# Patient Record
Sex: FEMALE | Race: BLACK OR AFRICAN AMERICAN | Employment: FULL TIME | URBAN - METROPOLITAN AREA
[De-identification: names, ages, dates, MRNs, and addresses within clinical notes are randomized per-mention and may not be internally consistent; named-entity substitution may affect disease eponyms.]

---

## 2017-02-24 ENCOUNTER — TELEPHONE (OUTPATIENT)
Dept: OBGYN CLINIC | Age: 32
End: 2017-02-24

## 2018-06-14 ENCOUNTER — HOSPITAL ENCOUNTER (OUTPATIENT)
Dept: LAB | Age: 33
Discharge: HOME | End: 2018-06-14
Payer: MEDICAID

## 2018-06-14 ENCOUNTER — OFFICE VISIT (OUTPATIENT)
Dept: OBGYN CLINIC | Age: 33
End: 2018-06-14

## 2018-06-14 VITALS
OXYGEN SATURATION: 100 % | BODY MASS INDEX: 30.43 KG/M2 | WEIGHT: 155 LBS | SYSTOLIC BLOOD PRESSURE: 130 MMHG | RESPIRATION RATE: 18 BRPM | DIASTOLIC BLOOD PRESSURE: 78 MMHG | HEART RATE: 77 BPM | HEIGHT: 60 IN

## 2018-06-14 DIAGNOSIS — R35.0 URINARY FREQUENCY: ICD-10-CM

## 2018-06-14 DIAGNOSIS — Z30.011 ENCOUNTER FOR INITIAL PRESCRIPTION OF CONTRACEPTIVE PILLS: ICD-10-CM

## 2018-06-14 DIAGNOSIS — Z01.419 ENCOUNTER FOR GYNECOLOGICAL EXAMINATION WITHOUT ABNORMAL FINDING: Primary | ICD-10-CM

## 2018-06-14 LAB
BILIRUB UR QL STRIP: NEGATIVE
GLUCOSE UR-MCNC: NEGATIVE MG/DL
HCG URINE, QL. (POC): NEGATIVE
KETONES P FAST UR STRIP-MCNC: NEGATIVE MG/DL
PH UR STRIP: 8.5 [PH] (ref 4.6–8)
PROT UR QL STRIP: NEGATIVE
SP GR UR STRIP: 1.02 (ref 1–1.03)
UA UROBILINOGEN AMB POC: ABNORMAL (ref 0.2–1)
URINALYSIS CLARITY POC: CLEAR
URINALYSIS COLOR POC: YELLOW
URINE BLOOD POC: ABNORMAL
URINE LEUKOCYTES POC: NEGATIVE
URINE NITRITES POC: NEGATIVE
VALID INTERNAL CONTROL?: YES

## 2018-06-14 PROCEDURE — 87624 HPV HI-RISK TYP POOLED RSLT: CPT | Performed by: OBSTETRICS & GYNECOLOGY

## 2018-06-14 PROCEDURE — 87491 CHLMYD TRACH DNA AMP PROBE: CPT | Performed by: OBSTETRICS & GYNECOLOGY

## 2018-06-14 PROCEDURE — 88175 CYTOPATH C/V AUTO FLUID REDO: CPT | Performed by: OBSTETRICS & GYNECOLOGY

## 2018-06-14 RX ORDER — NORGESTIMATE AND ETHINYL ESTRADIOL 0.25-0.035
1 KIT ORAL DAILY
Qty: 3 PACKAGE | Refills: 3 | Status: SHIPPED | OUTPATIENT
Start: 2018-06-14 | End: 2019-12-12 | Stop reason: ALTCHOICE

## 2018-06-14 NOTE — PROGRESS NOTES
Subjective:   29 y.o. female for Well Woman Check. Patient's last menstrual period was 05/25/2018. Social History: single partner, contraception - none. Pertinent past medical hstory: no history of HTN, DVT, CAD, DM, liver disease, migraines or smoking. Current Outpatient Prescriptions   Medication Sig Dispense Refill    ferrous sulfate ER (IRON) 160 mg (50 mg iron) TbER tablet Take 1 Tab by mouth daily. No Known Allergies  Past Medical History:   Diagnosis Date    Anemia      Past Surgical History:   Procedure Laterality Date    HX DILATION AND CURETTAGE       Family History   Problem Relation Age of Onset    Diabetes Mother     Hypertension Mother      Social History   Substance Use Topics    Smoking status: Never Smoker    Smokeless tobacco: Not on file    Alcohol use Yes        ROS:  Feeling well. No dyspnea or chest pain on exertion. No abdominal pain, change in bowel habits, black or bloody stools. She complains of frequent urination. GYN ROS: normal menses, no abnormal bleeding, pelvic pain or discharge, no breast pain or new or enlarging lumps on self exam. No neurological complaints. Objective:     Visit Vitals    /78    Pulse 77    Resp 18    Ht 5' (1.524 m)    Wt 155 lb (70.3 kg)    LMP 05/25/2018    SpO2 100%    BMI 30.27 kg/m2     The patient appears well, alert, oriented x 3, in no distress. ENT normal.  Neck supple. No adenopathy or thyromegaly. KESHA. Lungs are clear, good air entry, no wheezes, rhonchi or rales. S1 and S2 normal, no murmurs, regular rate and rhythm. Abdomen soft without tenderness, guarding, mass or organomegaly. Extremities show no edema, normal peripheral pulses. Neurological is normal, no focal findings.     BREAST EXAM: breasts appear normal, no suspicious masses, no skin or nipple changes or axillary nodes    PELVIC EXAM: normal external genitalia, vulva, vagina, cervix, uterus and adnexa, PAP: Pap smear done today, HPV test    Assessment/Plan:   well woman  no contraindication to begin use of oral contraceptives  pap smear  return annually or prn

## 2018-06-15 LAB
C TRACH RRNA SPEC QL NAA+PROBE: NEGATIVE
N GONORRHOEA RRNA SPEC QL NAA+PROBE: NEGATIVE
SPECIMEN SOURCE: NORMAL
T VAGINALIS RRNA SPEC QL NAA+PROBE: NEGATIVE

## 2019-04-16 ENCOUNTER — HOSPITAL ENCOUNTER (EMERGENCY)
Age: 34
Discharge: HOME | End: 2019-04-16
Attending: EMERGENCY MEDICINE | Admitting: EMERGENCY MEDICINE
Payer: MEDICAID

## 2019-04-16 VITALS
RESPIRATION RATE: 16 BRPM | SYSTOLIC BLOOD PRESSURE: 131 MMHG | TEMPERATURE: 97.9 F | OXYGEN SATURATION: 98 % | BODY MASS INDEX: 29.06 KG/M2 | WEIGHT: 148 LBS | HEART RATE: 86 BPM | HEIGHT: 60 IN | DIASTOLIC BLOOD PRESSURE: 83 MMHG

## 2019-04-16 DIAGNOSIS — N30.01 ACUTE CYSTITIS WITH HEMATURIA: Primary | ICD-10-CM

## 2019-04-16 DIAGNOSIS — E87.6 HYPOKALEMIA: ICD-10-CM

## 2019-04-16 DIAGNOSIS — A59.9 TRICHOMONIASIS: ICD-10-CM

## 2019-04-16 DIAGNOSIS — R10.32 ABDOMINAL CRAMPING, BILATERAL LOWER QUADRANT: ICD-10-CM

## 2019-04-16 DIAGNOSIS — R10.31 ABDOMINAL CRAMPING, BILATERAL LOWER QUADRANT: ICD-10-CM

## 2019-04-16 DIAGNOSIS — B96.89 BV (BACTERIAL VAGINOSIS): ICD-10-CM

## 2019-04-16 DIAGNOSIS — N76.0 BV (BACTERIAL VAGINOSIS): ICD-10-CM

## 2019-04-16 LAB
ALBUMIN SERPL-MCNC: 4.1 G/DL (ref 3.4–5)
ALBUMIN/GLOB SERPL: 0.9 {RATIO} (ref 0.8–1.7)
ALP SERPL-CCNC: 67 U/L (ref 45–117)
ALT SERPL-CCNC: 21 U/L (ref 13–56)
ANION GAP SERPL CALC-SCNC: 5 MMOL/L (ref 3–18)
APPEARANCE UR: ABNORMAL
AST SERPL-CCNC: 13 U/L (ref 15–37)
BACTERIA URNS QL MICRO: ABNORMAL /HPF
BASOPHILS # BLD: 0 K/UL (ref 0–0.1)
BASOPHILS NFR BLD: 0 % (ref 0–2)
BILIRUB SERPL-MCNC: 0.7 MG/DL (ref 0.2–1)
BILIRUB UR QL: ABNORMAL
BUN SERPL-MCNC: 7 MG/DL (ref 7–18)
BUN/CREAT SERPL: 10 (ref 12–20)
CALCIUM SERPL-MCNC: 9.3 MG/DL (ref 8.5–10.1)
CHLORIDE SERPL-SCNC: 104 MMOL/L (ref 100–108)
CO2 SERPL-SCNC: 30 MMOL/L (ref 21–32)
COLOR UR: ABNORMAL
CREAT SERPL-MCNC: 0.71 MG/DL (ref 0.6–1.3)
DIFFERENTIAL METHOD BLD: ABNORMAL
EOSINOPHIL # BLD: 0.1 K/UL (ref 0–0.4)
EOSINOPHIL NFR BLD: 1 % (ref 0–5)
EPITH CASTS URNS QL MICRO: ABNORMAL /LPF (ref 0–5)
ERYTHROCYTE [DISTWIDTH] IN BLOOD BY AUTOMATED COUNT: 12.9 % (ref 11.6–14.5)
GLOBULIN SER CALC-MCNC: 4.5 G/DL (ref 2–4)
GLUCOSE SERPL-MCNC: 83 MG/DL (ref 74–99)
GLUCOSE UR STRIP.AUTO-MCNC: NEGATIVE MG/DL
HCG UR QL: NEGATIVE
HCT VFR BLD AUTO: 36.6 % (ref 35–45)
HGB BLD-MCNC: 12 G/DL (ref 12–16)
HGB UR QL STRIP: ABNORMAL
KETONES UR QL STRIP.AUTO: ABNORMAL MG/DL
LEUKOCYTE ESTERASE UR QL STRIP.AUTO: ABNORMAL
LYMPHOCYTES # BLD: 2.8 K/UL (ref 0.9–3.6)
LYMPHOCYTES NFR BLD: 45 % (ref 21–52)
MCH RBC QN AUTO: 30.5 PG (ref 24–34)
MCHC RBC AUTO-ENTMCNC: 32.8 G/DL (ref 31–37)
MCV RBC AUTO: 92.9 FL (ref 74–97)
MONOCYTES # BLD: 0.4 K/UL (ref 0.05–1.2)
MONOCYTES NFR BLD: 7 % (ref 3–10)
NEUTS SEG # BLD: 2.9 K/UL (ref 1.8–8)
NEUTS SEG NFR BLD: 47 % (ref 40–73)
NITRITE UR QL STRIP.AUTO: NEGATIVE
PH UR STRIP: 5.5 [PH] (ref 5–8)
PLATELET # BLD AUTO: 287 K/UL (ref 135–420)
PMV BLD AUTO: 9.8 FL (ref 9.2–11.8)
POTASSIUM SERPL-SCNC: 3.3 MMOL/L (ref 3.5–5.5)
PROT SERPL-MCNC: 8.6 G/DL (ref 6.4–8.2)
PROT UR STRIP-MCNC: ABNORMAL MG/DL
RBC # BLD AUTO: 3.94 M/UL (ref 4.2–5.3)
RBC #/AREA URNS HPF: ABNORMAL /HPF (ref 0–5)
SERVICE CMNT-IMP: NORMAL
SODIUM SERPL-SCNC: 139 MMOL/L (ref 136–145)
SP GR UR REFRACTOMETRY: 1.03 (ref 1–1.03)
TRICHOMONAS UR QL MICRO: ABNORMAL
UROBILINOGEN UR QL STRIP.AUTO: 1 EU/DL (ref 0.2–1)
WBC # BLD AUTO: 6.2 K/UL (ref 4.6–13.2)
WBC URNS QL MICRO: ABNORMAL /HPF (ref 0–4)
WET PREP GENITAL: NORMAL

## 2019-04-16 PROCEDURE — 81025 URINE PREGNANCY TEST: CPT

## 2019-04-16 PROCEDURE — 85025 COMPLETE CBC W/AUTO DIFF WBC: CPT

## 2019-04-16 PROCEDURE — 87491 CHLMYD TRACH DNA AMP PROBE: CPT

## 2019-04-16 PROCEDURE — 80053 COMPREHEN METABOLIC PANEL: CPT

## 2019-04-16 PROCEDURE — 74011250636 HC RX REV CODE- 250/636: Performed by: PHYSICIAN ASSISTANT

## 2019-04-16 PROCEDURE — 81001 URINALYSIS AUTO W/SCOPE: CPT

## 2019-04-16 PROCEDURE — 96372 THER/PROPH/DIAG INJ SC/IM: CPT

## 2019-04-16 PROCEDURE — 99283 EMERGENCY DEPT VISIT LOW MDM: CPT

## 2019-04-16 PROCEDURE — 96361 HYDRATE IV INFUSION ADD-ON: CPT

## 2019-04-16 PROCEDURE — 96374 THER/PROPH/DIAG INJ IV PUSH: CPT

## 2019-04-16 PROCEDURE — 87210 SMEAR WET MOUNT SALINE/INK: CPT

## 2019-04-16 PROCEDURE — 87086 URINE CULTURE/COLONY COUNT: CPT

## 2019-04-16 PROCEDURE — 74011250637 HC RX REV CODE- 250/637: Performed by: PHYSICIAN ASSISTANT

## 2019-04-16 RX ORDER — AZITHROMYCIN 250 MG/1
1000 TABLET, FILM COATED ORAL
Status: COMPLETED | OUTPATIENT
Start: 2019-04-16 | End: 2019-04-16

## 2019-04-16 RX ORDER — METRONIDAZOLE 500 MG/1
500 TABLET ORAL 2 TIMES DAILY
Qty: 20 TAB | Refills: 0 | Status: SHIPPED | OUTPATIENT
Start: 2019-04-16 | End: 2019-04-26

## 2019-04-16 RX ORDER — POTASSIUM CHLORIDE 20 MEQ/1
40 TABLET, EXTENDED RELEASE ORAL
Status: COMPLETED | OUTPATIENT
Start: 2019-04-16 | End: 2019-04-16

## 2019-04-16 RX ORDER — ONDANSETRON 2 MG/ML
4 INJECTION INTRAMUSCULAR; INTRAVENOUS
Status: COMPLETED | OUTPATIENT
Start: 2019-04-16 | End: 2019-04-16

## 2019-04-16 RX ORDER — NITROFURANTOIN 25; 75 MG/1; MG/1
100 CAPSULE ORAL 2 TIMES DAILY
Qty: 10 CAP | Refills: 0 | Status: SHIPPED | OUTPATIENT
Start: 2019-04-16 | End: 2019-04-21

## 2019-04-16 RX ADMIN — POTASSIUM CHLORIDE 40 MEQ: 20 TABLET, EXTENDED RELEASE ORAL at 15:40

## 2019-04-16 RX ADMIN — LIDOCAINE HYDROCHLORIDE 250 MG: 10 INJECTION, SOLUTION EPIDURAL; INFILTRATION; INTRACAUDAL; PERINEURAL at 15:40

## 2019-04-16 RX ADMIN — ONDANSETRON 4 MG: 2 INJECTION INTRAMUSCULAR; INTRAVENOUS at 15:40

## 2019-04-16 RX ADMIN — SODIUM CHLORIDE 1000 ML: 900 INJECTION, SOLUTION INTRAVENOUS at 14:00

## 2019-04-16 RX ADMIN — AZITHROMYCIN 1000 MG: 250 TABLET, FILM COATED ORAL at 15:40

## 2019-04-16 NOTE — ED PROVIDER NOTES
EMERGENCY DEPARTMENT HISTORY AND PHYSICAL EXAM 
 
Date: 4/16/2019 Patient Name: Yohana Aldridge History of Presenting Illness Chief Complaint Patient presents with  Abdominal Pain History Provided By: Patient Chief Complaint: Abdominal cramping Duration: 10 Days Timing:  Intermittent Location: Bilateral lower abdomen and bilateral lower back Quality: Cramping Severity: 5 out of 10 Modifying Factors: None. Associated Symptoms: hematuria, vaginal odor, irregular menses Additional History (Context):  
2:15 PM  
Yohana Aldridge is a 36 y.o. female with PMHX of pyelonephritis and anemia who presents to the emergency department C/O 10 days of intermittent bilateral lower abdominal and back pain. Pt states it feels like menstrual cramping. Pt states she was seen at [facility] yesterday and diagnosed with hematuria. Pt also reporting vaginal odor but not discharge and does not have specific concerns for STI but would like to be tested. Pt denies fevers, nausea, vomiting, diarrhea (last BM yesterday and normal), dysuria, gross hematuria, vaginal discharge, change in appetite, and any other sxs or complaints. LMP 3/31/19 but it was shorter than normal but she has had several negative pregnancy tests since then. PCP: Other, MD Foreign 
 
Current Facility-Administered Medications Medication Dose Route Frequency Provider Last Rate Last Dose  potassium chloride (K-DUR, KLOR-CON) SR tablet 40 mEq  40 mEq Oral NOW Julia Fatima PA-C      
 cefTRIAXone (ROCEPHIN) 250 mg in lidocaine (PF) (XYLOCAINE) 10 mg/mL (1 %) IM injection  250 mg IntraMUSCular ONCE Julia Fatima PA-C      
 azithromycin (ZITHROMAX) tablet 1,000 mg  1,000 mg Oral NOW Julia Fatima PA-C      
 ondansetron (ZOFRAN) injection 4 mg  4 mg IntraVENous NOW Juventino BROWN PA-C Current Outpatient Medications Medication Sig Dispense Refill  nitrofurantoin, macrocrystal-monohydrate, (MACROBID) 100 mg capsule Take 1 Cap by mouth two (2) times a day for 5 days. 10 Cap 0  
 metroNIDAZOLE (FLAGYL) 500 mg tablet Take 1 Tab by mouth two (2) times a day for 10 days. Indications: vaginosis caused by bacteria 20 Tab 0  
 norgestimate-ethinyl estradiol (ORTHO-CYCLEN, SPRINTEC) 0.25-35 mg-mcg tab Take 1 Tab by mouth daily. 3 Package 3  ferrous sulfate ER (IRON) 160 mg (50 mg iron) TbER tablet Take 1 Tab by mouth daily. Past History Past Medical History: 
Past Medical History:  
Diagnosis Date  Anemia Past Surgical History: 
Past Surgical History:  
Procedure Laterality Date  HX DILATION AND CURETTAGE Family History: 
Family History Problem Relation Age of Onset  Diabetes Mother  Hypertension Mother Social History: 
Social History Tobacco Use  Smoking status: Never Smoker Substance Use Topics  Alcohol use: Yes  Drug use: No  
 
 
Allergies: 
No Known Allergies Review of Systems Review of Systems Constitutional: Negative for fever. Gastrointestinal: Positive for abdominal pain and diarrhea. Negative for blood in stool, nausea and vomiting. Genitourinary: Positive for hematuria and menstrual problem. Negative for difficulty urinating, dysuria and vaginal discharge. Musculoskeletal: Positive for back pain. All other systems reviewed and are negative. Physical Exam  
 
Vitals:  
 04/16/19 1331 04/16/19 1400 BP: 131/83 Pulse: 86 Resp: 16 Temp: 97.9 °F (36.6 °C) SpO2:  98% Weight: 67.1 kg (148 lb) Height: 5' (1.524 m) Physical Exam  
Constitutional: She appears well-developed and well-nourished. No distress. HENT:  
Head: Normocephalic and atraumatic. Right Ear: External ear normal.  
Left Ear: External ear normal.  
Nose: Nose normal.  
Eyes: Conjunctivae are normal.  
Neck: Normal range of motion. Cardiovascular: Normal rate, regular rhythm and normal heart sounds. Pulmonary/Chest: Effort normal and breath sounds normal. No respiratory distress. Abdominal: Soft. Bowel sounds are normal. She exhibits no distension. There is no tenderness. There is no rebound and no guarding. Negative CVA tenderness bilaterally. Neurological: She is alert. Skin: Skin is warm and dry. She is not diaphoretic. Psychiatric: She has a normal mood and affect. Nursing note and vitals reviewed. Diagnostic Study Results Labs - Recent Results (from the past 12 hour(s)) URINALYSIS W/ RFLX MICROSCOPIC Collection Time: 04/16/19  1:30 PM  
Result Value Ref Range Color DARK YELLOW Appearance TURBID Specific gravity 1.027 1.005 - 1.030    
 pH (UA) 5.5 5.0 - 8.0 Protein TRACE (A) NEG mg/dL Glucose NEGATIVE  NEG mg/dL Ketone TRACE (A) NEG mg/dL Bilirubin SMALL (A) NEG Blood TRACE (A) NEG Urobilinogen 1.0 0.2 - 1.0 EU/dL Nitrites NEGATIVE  NEG Leukocyte Esterase SMALL (A) NEG    
HCG URINE, QL Collection Time: 04/16/19  1:30 PM  
Result Value Ref Range HCG urine, QL NEGATIVE  NEG    
URINE MICROSCOPIC ONLY Collection Time: 04/16/19  1:30 PM  
Result Value Ref Range WBC 3 to 5 0 - 4 /hpf  
 RBC 1 to 3 0 - 5 /hpf Epithelial cells 3+ 0 - 5 /lpf Bacteria 1+ (A) NEG /hpf Trichomonas FEW (A) NEG    
CBC WITH AUTOMATED DIFF Collection Time: 04/16/19  1:46 PM  
Result Value Ref Range WBC 6.2 4.6 - 13.2 K/uL  
 RBC 3.94 (L) 4.20 - 5.30 M/uL  
 HGB 12.0 12.0 - 16.0 g/dL HCT 36.6 35.0 - 45.0 % MCV 92.9 74.0 - 97.0 FL  
 MCH 30.5 24.0 - 34.0 PG  
 MCHC 32.8 31.0 - 37.0 g/dL  
 RDW 12.9 11.6 - 14.5 % PLATELET 673 500 - 919 K/uL MPV 9.8 9.2 - 11.8 FL  
 NEUTROPHILS 47 40 - 73 % LYMPHOCYTES 45 21 - 52 % MONOCYTES 7 3 - 10 % EOSINOPHILS 1 0 - 5 % BASOPHILS 0 0 - 2 %  
 ABS. NEUTROPHILS 2.9 1.8 - 8.0 K/UL ABS. LYMPHOCYTES 2.8 0.9 - 3.6 K/UL  
 ABS. MONOCYTES 0.4 0.05 - 1.2 K/UL  
 ABS. EOSINOPHILS 0.1 0.0 - 0.4 K/UL  
 ABS. BASOPHILS 0.0 0.0 - 0.1 K/UL  
 DF AUTOMATED METABOLIC PANEL, COMPREHENSIVE Collection Time: 04/16/19  1:46 PM  
Result Value Ref Range Sodium 139 136 - 145 mmol/L Potassium 3.3 (L) 3.5 - 5.5 mmol/L Chloride 104 100 - 108 mmol/L  
 CO2 30 21 - 32 mmol/L Anion gap 5 3.0 - 18 mmol/L Glucose 83 74 - 99 mg/dL BUN 7 7.0 - 18 MG/DL Creatinine 0.71 0.6 - 1.3 MG/DL  
 BUN/Creatinine ratio 10 (L) 12 - 20 GFR est AA >60 >60 ml/min/1.73m2 GFR est non-AA >60 >60 ml/min/1.73m2 Calcium 9.3 8.5 - 10.1 MG/DL Bilirubin, total 0.7 0.2 - 1.0 MG/DL  
 ALT (SGPT) 21 13 - 56 U/L  
 AST (SGOT) 13 (L) 15 - 37 U/L Alk. phosphatase 67 45 - 117 U/L Protein, total 8.6 (H) 6.4 - 8.2 g/dL Albumin 4.1 3.4 - 5.0 g/dL Globulin 4.5 (H) 2.0 - 4.0 g/dL A-G Ratio 0.9 0.8 - 1.7 WET PREP Collection Time: 04/16/19  2:27 PM  
Result Value Ref Range Special Requests: NO SPECIAL REQUESTS Wet prep MODERATE MOTILE TRICHOMONAS NOTED Wet prep MODERATE 
CLUE CELLS PRESENT Wet prep NO FUNGAL ELEMENTS SEEN Radiologic Studies - No orders to display CT Results  (Last 48 hours) None CXR Results  (Last 48 hours) None Medications given in the ED- Medications  
potassium chloride (K-DUR, KLOR-CON) SR tablet 40 mEq (has no administration in time range)  
cefTRIAXone (ROCEPHIN) 250 mg in lidocaine (PF) (XYLOCAINE) 10 mg/mL (1 %) IM injection (has no administration in time range) azithromycin (ZITHROMAX) tablet 1,000 mg (has no administration in time range)  
ondansetron (ZOFRAN) injection 4 mg (has no administration in time range)  
sodium chloride 0.9 % bolus infusion 1,000 mL (1,000 mL IntraVENous New Bag 4/16/19 1400) Medical Decision Making I am the first provider for this patient. I reviewed the vital signs, available nursing notes, past medical history, past surgical history, family history and social history. Vital Signs-Reviewed the patient's vital signs. Pulse Oximetry Analysis - 98% on RA Records Reviewed: Nursing Notes and Old Medical Records Procedures: 
Pelvic Exam 
Date/Time: 4/16/2019 3:33 PM 
Performed by: PA Exam assisted by:  SUNNY Caicedo. Type of exam performed: bimanual and speculum. External genitalia appearance: normal.   
Vaginal exam:  discharge. The amount of discharge was:  mild. The discharge was white and thick. Cervical exam:  normal and os closed. Specimen(s) collected:  GC, chlamydia and vaginal culture. Bimanual exam:  normal.   
Patient tolerance: Patient tolerated the procedure well with no immediate complications ED Course:  
Initial assessment performed. The patients presenting problems have been discussed, and they are in agreement with the care plan formulated and outlined with them. I have encouraged them to ask questions as they arise throughout their visit. 3:34 PM Discussed results with patient. She prefers to be treated for GC/chlamydia as well. Provider Notes (Medical Decision Making): Appears well hydrated and non toxic, with stable vital signs, presenting with intermittent lower abdominal and back cramping and vaginal odor. Abdominal exam is benign, abdomen is soft and nontender throughout and is not consistent with acute or surgical abdomen. Her CBC and CMP are reassuring. UA is c/w UTI, will culture and tx with macrobid. Clinically, she does not have signs to suggest pyelonephritis or ureteral stone. Wet prep + clue cells and trichomoniasis, will treat both with flagyl. Her pelvic exam is unremarkable and is not consistent with PID and she has no adnexal tenderness to suggest abscess, therefore I do not feel emergent US is necessary.  Based on today's assessment, I feel the patient is stable for discharge to home with outpatient follow up. Return precautions and referrals provided. Diagnosis and Disposition DISCHARGE NOTE: 
3:34 PM  
Gladys Bonner's  results have been reviewed with her. She has been counseled regarding her diagnosis. She verbally conveys understanding and agreement of the signs, symptoms, diagnosis, treatment and prognosis and additionally agrees to follow up as recommended with Foreign Rodriguez MD in 24 - 48 hours. She also agrees with the care-plan and conveys that all of her questions have been answered. I have also put together some discharge instructions for her that include: 1) educational information regarding their diagnosis, 2) how to care for their diagnosis at home, as well a 3) list of reasons why they would want to return to the ED prior to their follow-up appointment, should their condition change. CLINICAL IMPRESSION: 
 
1. Acute cystitis with hematuria 2. Abdominal cramping, bilateral lower quadrant 3. Trichomoniasis 4. BV (bacterial vaginosis) 5. Hypokalemia PLAN: 
1. D/C Home 2. Current Discharge Medication List  
  
START taking these medications Details  
nitrofurantoin, macrocrystal-monohydrate, (MACROBID) 100 mg capsule Take 1 Cap by mouth two (2) times a day for 5 days. Qty: 10 Cap, Refills: 0  
  
metroNIDAZOLE (FLAGYL) 500 mg tablet Take 1 Tab by mouth two (2) times a day for 10 days. Indications: vaginosis caused by bacteria Qty: 20 Tab, Refills: 0  
  
  
 
3. Follow-up Information Follow up With Specialties Details Why Contact Info [address]   As needed [address] [place] [address]  
[phone] [address]  As needed for primary care needs [address] ([facility]) [address] [phone] [facility] EMERGENCY DEPT Emergency Medicine  As needed, If symptoms worsen [address] 
[phone]

## 2019-04-16 NOTE — ED NOTES
I have reviewed discharge instructions with the patient. The patient verbalized understanding. Patient armband removed and shredded. No adverse reaction to the medication.

## 2019-04-16 NOTE — DISCHARGE INSTRUCTIONS
Antibiotics as prescribed and until finished. Return to the ER for persistent or worsening symptoms, chest pain, shortness of breath, fevers, concerns about dehydration, if you are unable to follow up as instructed, and for any other concerns. No sex for 10 days and until all sexual partners have been tested and treated. Trichomoniasis: Care Instructions  Your Care Instructions  Trichomoniasis is a sexually transmitted infection (STI) that is spread by having sex with an infected partner. Trichomoniasis is commonly called trich (say \"trick\"). In women, trich may cause vaginal itching and a smelly discharge. But in many cases, especially in men, there are no symptoms. Isa Hong is treated so that you do not spread it to others. Both you and your sex partner or partners should be treated at the same time so you do not infect each other again. Trich may cause problems with pregnancy. Your doctor will talk with you about treatment for Trich if you are pregnant. Follow-up care is a key part of your treatment and safety. Be sure to make and go to all appointments, and call your doctor if you are having problems. It's also a good idea to know your test results and keep a list of the medicines you take. How can you care for yourself at home? · Take your antibiotics as directed. Do not stop taking them just because you feel better. You need to take the full course of antibiotics. · Do not have sex while you are being treated. If your doctor gave you a single dose of antibiotics, do not have sex for one week after being treated and until your partner also has been treated. · Tell your sex partner (or partners) that he or she will also need to be tested and treated. · Use a cold water compress or cool baths to relieve itching. To prevent trichomoniasis in the future  · Use latex condoms every time you have sex. Use them from the beginning to the end of sexual contact. · Talk to your partner before having sex. Find out if he or she has or is at risk for trich or any other STI. Keep in mind that a person may be able to spread an STI even if he or she does not have symptoms. · Do not have sex if you are being treated for trich or any other STI. · Do not have sex with anyone who has symptoms of an STI, such as sores on the genitals or mouth. · Having one sex partner (who does not have STIs and does not have sex with anyone else) is a good way to avoid STIs. When should you call for help? Call your doctor now or seek immediate medical care if:    · You have unusual vaginal bleeding.     · You have a fever.     · You have new discharge from the vagina or penis.     · You have pelvic pain.    Watch closely for changes in your health, and be sure to contact your doctor if:    · You do not get better as expected.     · You have any new symptoms or your symptoms get worse. Where can you learn more? Go to http://chante-orion.info/. Enter I344 in the search box to learn more about \"Trichomoniasis: Care Instructions. \"  Current as of: September 11, 2018  Content Version: 11.9  © 8804-0540 [employer]. Care instructions adapted under license by "[employer] (which disclaims liability or warranty for this information). If you have questions about a medical condition or this instruction, always ask your healthcare professional. [address] any warranty or liability for your use of this information.

## 2019-04-17 LAB
C TRACH RRNA SPEC QL NAA+PROBE: NEGATIVE
N GONORRHOEA RRNA SPEC QL NAA+PROBE: NEGATIVE
SPECIMEN SOURCE: NORMAL

## 2019-04-18 LAB
BACTERIA SPEC CULT: ABNORMAL
SERVICE CMNT-IMP: ABNORMAL

## 2019-04-26 ENCOUNTER — OFFICE VISIT (OUTPATIENT)
Dept: OBGYN CLINIC | Age: 34
End: 2019-04-26

## 2019-04-26 ENCOUNTER — HOSPITAL ENCOUNTER (OUTPATIENT)
Dept: LAB | Age: 34
Discharge: HOME | End: 2019-04-26
Payer: MEDICAID

## 2019-04-26 VITALS
SYSTOLIC BLOOD PRESSURE: 122 MMHG | WEIGHT: 151 LBS | HEART RATE: 99 BPM | HEIGHT: 60 IN | RESPIRATION RATE: 18 BRPM | DIASTOLIC BLOOD PRESSURE: 88 MMHG | BODY MASS INDEX: 29.64 KG/M2

## 2019-04-26 DIAGNOSIS — Z11.3 SCREEN FOR STD (SEXUALLY TRANSMITTED DISEASE): Primary | ICD-10-CM

## 2019-04-26 DIAGNOSIS — Z31.69 ENCOUNTER FOR PRECONCEPTION CONSULTATION: ICD-10-CM

## 2019-04-26 PROCEDURE — 87491 CHLMYD TRACH DNA AMP PROBE: CPT

## 2019-04-26 NOTE — PROGRESS NOTES
Subjective:      Patient presents for follow up from an Ed visit for pelvic pain at which she was diagnosed with trichomonas. She requests a KENDALL and is upset because her boyfriend was tested and was negative. She also is concerned because she has had unprotected intercourse for 8 months and has not achieved pregnancy. Current Outpatient Medications   Medication Sig Dispense Refill    metroNIDAZOLE (FLAGYL) 500 mg tablet Take 1 Tab by mouth two (2) times a day for 10 days. Indications: vaginosis caused by bacteria 20 Tab 0    norgestimate-ethinyl estradiol (ORTHO-CYCLEN, SPRINTEC) 0.25-35 mg-mcg tab Take 1 Tab by mouth daily. 3 Package 3    ferrous sulfate ER (IRON) 160 mg (50 mg iron) TbER tablet Take 1 Tab by mouth daily. No Known Allergies  Past Medical History:   Diagnosis Date    Anemia      Past Surgical History:   Procedure Laterality Date    HX DILATION AND CURETTAGE       Family History   Problem Relation Age of Onset    Diabetes Mother     Hypertension Mother      Social History     Tobacco Use    Smoking status: Never Smoker   Substance Use Topics    Alcohol use: Yes      Review of Systems    A comprehensive review of systems was negative except for that written in the HPI. Objective:     Visit Vitals  /88 (BP 1 Location: Left arm, BP Patient Position: Sitting)   Pulse 99   Resp 18   Ht 5' (1.524 m)   Wt 151 lb (68.5 kg)   LMP 03/31/2019   BMI 29.49 kg/m²     General appearance: alert, cooperative, no distress, appears stated age  Pelvic: External genitalia normal, Vagina normal without discharge, cervix normal in appearance, no CMT       Assessment/Plan:     STD screening  Discussed ovulation charting, gave BBT chart with instructions. Follow up prn. Plan of care discussed. Patient expressed understanding.

## 2019-05-01 ENCOUNTER — TELEPHONE (OUTPATIENT)
Dept: OBGYN CLINIC | Age: 34
End: 2019-05-01

## 2019-05-01 NOTE — TELEPHONE ENCOUNTER
Walter Copeland called to see if her results from her KENDALL were back . Her appointment was April 26th . Please advise. Thank you .

## 2019-12-12 ENCOUNTER — OFFICE VISIT (OUTPATIENT)
Dept: OBGYN CLINIC | Age: 34
End: 2019-12-12

## 2019-12-12 VITALS
HEIGHT: 60 IN | BODY MASS INDEX: 26.31 KG/M2 | DIASTOLIC BLOOD PRESSURE: 79 MMHG | SYSTOLIC BLOOD PRESSURE: 117 MMHG | HEART RATE: 82 BPM | WEIGHT: 134 LBS

## 2019-12-12 DIAGNOSIS — Z01.419 WELL WOMAN EXAM WITH ROUTINE GYNECOLOGICAL EXAM: ICD-10-CM

## 2019-12-12 DIAGNOSIS — Z30.09 FAMILY PLANNING: Primary | ICD-10-CM

## 2019-12-12 DIAGNOSIS — Z11.3 ROUTINE SCREENING FOR STI (SEXUALLY TRANSMITTED INFECTION): ICD-10-CM

## 2019-12-12 LAB
HCG URINE, QL. (POC): NEGATIVE
VALID INTERNAL CONTROL?: YES

## 2019-12-12 RX ORDER — MEDROXYPROGESTERONE ACETATE 150 MG/ML
150 INJECTION, SUSPENSION INTRAMUSCULAR ONCE
Qty: 1 ML | Refills: 3 | Status: SHIPPED | OUTPATIENT
Start: 2019-12-12 | End: 2019-12-12

## 2019-12-12 RX ORDER — NORGESTIMATE AND ETHINYL ESTRADIOL 0.25-0.035
1 KIT ORAL DAILY
Qty: 3 PACKAGE | Refills: 3 | Status: CANCELLED | OUTPATIENT
Start: 2019-12-12

## 2019-12-12 NOTE — PROGRESS NOTES
Subjective:   36 y.o. female for Well Woman Check. She is without complaints. She desires to restart depo provera for contraception. No LMP recorded. Social History: single partner, contraception - none. Pertinent past medical hstory: no history of HTN, DVT, CAD, DM, liver disease, migraines or smoking. There is no problem list on file for this patient. There are no active problems to display for this patient. Current Outpatient Medications   Medication Sig Dispense Refill    medroxyPROGESTERone (DEPO-PROVERA) 150 mg/mL injection 1 mL by IntraMUSCular route once for 1 dose. 1 mL 3    ferrous sulfate ER (IRON) 160 mg (50 mg iron) TbER tablet Take 1 Tab by mouth daily. No Known Allergies  Past Medical History:   Diagnosis Date    Anemia      Past Surgical History:   Procedure Laterality Date    HX DILATION AND CURETTAGE       Family History   Problem Relation Age of Onset    Diabetes Mother     Hypertension Mother      Social History     Tobacco Use    Smoking status: Never Smoker    Smokeless tobacco: Never Used   Substance Use Topics    Alcohol use: Yes        ROS:  Feeling well. No dyspnea or chest pain on exertion. No abdominal pain, change in bowel habits, black or bloody stools. No urinary tract symptoms. GYN ROS: normal menses, no abnormal bleeding, pelvic pain or discharge, no breast pain or new or enlarging lumps on self exam. No neurological complaints. Objective:     Visit Vitals  /79   Pulse 82   Ht 5' (1.524 m)   Wt 134 lb (60.8 kg)   BMI 26.17 kg/m²     The patient appears well, alert, oriented x 3, in no distress. ENT normal.  Neck supple. No adenopathy or thyromegaly. KESHA. Lungs are clear, good air entry, no wheezes, rhonchi or rales. S1 and S2 normal, no murmurs, regular rate and rhythm. Abdomen soft without tenderness, guarding, mass or organomegaly. Extremities show no edema, normal peripheral pulses. Neurological is normal, no focal findings.     BREAST EXAM: breasts appear normal, no suspicious masses, no skin or nipple changes or axillary nodes    PELVIC EXAM: normal external genitalia, vulva, vagina, cervix, uterus and adnexa    Assessment/Plan:   well woman  no contraindication to begin hormonal therapy  pap smear  counseled on breast self exam  return annually or prn    ICD-10-CM ICD-9-CM    1. Well woman exam with routine gynecological exam Z01.419 V72.31    2. Family planning Z30.09 V25.09 medroxyPROGESTERone (DEPO-PROVERA) 150 mg/mL injection   3. Routine screening for STI (sexually transmitted infection) Z11.3 V74.5 CHLAMYDIA/NEISSERIA/TRICHOMONAS AMP      AMB POC URINE PREGNANCY TEST, VISUAL COLOR COMPARISON      T PALLIDUM AB      HEP B SURFACE AG      HEPATITIS C AB      HIV 1/2 AG/AB, 4TH GENERATION,W RFLX CONFIRM     Encounter Diagnoses   Name Primary?  Well woman exam with routine gynecological exam Yes    Family planning     Routine screening for STI (sexually transmitted infection)      Orders Placed This Encounter    CHLAMYDIA/NEISSERIA/TRICHOMONAS AMP ([facility] Only)    T PALLIDUM AB    HEP B SURFACE AG    HEPATITIS C AB    HIV 1/2 AG/AB, 4TH GENERATION,W RFLX CONFIRM    AMB POC URINE PREGNANCY TEST, VISUAL COLOR COMPARISON    medroxyPROGESTERone (DEPO-PROVERA) 150 mg/mL injection     Follow-up and Dispositions    · Return in about 1 year (around 12/12/2020) for Annual Exam or prn. Sebastian Monahan

## 2019-12-12 NOTE — PROGRESS NOTES
Patient  Here to receive Medroxy-progesterone 150 mg/ml    Verbal order obtained from Marino Nelson  Medication check witness by ,LPN,  2 identifiers checked  by both nurses   Visit Vitals  /79   Pulse 82   Ht 5' (1.524 m)   Wt 134 lb (60.8 kg)   BMI 26.17 kg/m²     Urine pregnancy test negative Depo  Depo Provera  150 mg /1 mL   Was place in the right gluteus    LOT # [id number] expires 08/01/21  Patient observed for 15 minutes and discharge home in stable condition  Discharge instructions given to follow up in office in 90 days for her next injection

## 2019-12-12 NOTE — PROGRESS NOTES
PATIENT IS HERE TODAY FOR HER ANNUAL EXAM,SHE IS REQUESTING REFILL ON HER  BIRTH CONTROL     Jb Arreaga presents today for   Chief Complaint   Patient presents with    Well Woman       Is someone accompanying this pt? NO   atient does not have any concerns at this time. Patient wants  birth control at this time . Depression screening     Is the patient using any DME equipment during OV? NO      No flowsheet data found. This Visit Test  Results for orders placed or performed during the hospital encounter of 04/26/19   CHLAMYDIA/NEISSERIA/TRICHOMONAS AMP   Result Value Ref Range    Chlamydia amplification NEGATIVE  NEG      N. gonorrhoeae amplification NEGATIVE  NEG      Trichomonas amplification NEGATIVE  NEG      Specimen Source VAGINAL         Health Maintenance reviewed and discussed and ordered per Provider. Health Maintenance Due   Topic Date Due    Influenza Age 5 to Adult  08/01/2019   . Coordination of Care:  1. Have you been to the ER, urgent care clinic since your last visit? Hospitalized since your last visit? NO    2. Have you seen or consulted any other health care providers outside of the [address] [place] [address] since your last visit? Include any pap smears or colon screening.  NO

## 2019-12-15 LAB
C TRACH RRNA SPEC QL NAA+PROBE: NEGATIVE
N GONORRHOEA RRNA SPEC QL NAA+PROBE: NEGATIVE
SPECIMEN STATUS REPORT, ROLRST: NORMAL
T VAGINALIS DNA SPEC QL NAA+PROBE: NEGATIVE

## 2019-12-18 ENCOUNTER — TELEPHONE (OUTPATIENT)
Dept: OBGYN CLINIC | Age: 34
End: 2019-12-18

## 2020-03-03 ENCOUNTER — CLINICAL SUPPORT (OUTPATIENT)
Dept: OBGYN CLINIC | Age: 35
End: 2020-03-03

## 2020-03-03 VITALS
RESPIRATION RATE: 18 BRPM | WEIGHT: 136 LBS | HEIGHT: 60 IN | HEART RATE: 85 BPM | SYSTOLIC BLOOD PRESSURE: 119 MMHG | BODY MASS INDEX: 26.7 KG/M2 | TEMPERATURE: 96.2 F | DIASTOLIC BLOOD PRESSURE: 75 MMHG

## 2020-03-03 DIAGNOSIS — Z30.09 FAMILY PLANNING: Primary | ICD-10-CM

## 2020-03-03 LAB
HCG URINE, QL. (POC): NEGATIVE
VALID INTERNAL CONTROL?: YES

## 2020-03-03 NOTE — PROGRESS NOTES
Patient  Here to receive Medroxy-progesterone 150 mg/ml    Verbal order obtained from Dr. Sajan Melara  Medication check witness by Candi Montero,  2 identifiers checked  by both nurses   Visit Vitals  /75   Pulse 85   Temp 96.2 °F (35.7 °C) (Oral)   Resp 18   Ht 5' (1.524 m)   Wt 136 lb (61.7 kg)   BMI 26.56 kg/m²     Urine pregnancy test negative Depo  Depo Provera  150 mg /1 mL   Was place in the left gluteus   LOT # [id number] expires 02/2022  Patient observed for 15 minutes and discharge home in stable condition  Discharge instructions given to follow up in office in 90 days for her next injection
DISPLAY PLAN FREE TEXT
DISPLAY PLAN FREE TEXT

## 2020-05-19 ENCOUNTER — CLINICAL SUPPORT (OUTPATIENT)
Dept: OBGYN CLINIC | Age: 35
End: 2020-05-19

## 2020-05-19 VITALS
HEART RATE: 72 BPM | WEIGHT: 147 LBS | SYSTOLIC BLOOD PRESSURE: 113 MMHG | DIASTOLIC BLOOD PRESSURE: 69 MMHG | BODY MASS INDEX: 28.86 KG/M2 | HEIGHT: 60 IN

## 2020-05-19 DIAGNOSIS — Z30.09 FAMILY PLANNING: Primary | ICD-10-CM

## 2020-05-19 LAB
HCG URINE, QL. (POC): NEGATIVE
VALID INTERNAL CONTROL?: YES

## 2020-05-19 NOTE — PROGRESS NOTES
Patient  Here to receive Medroxy-progesterone 150 mg/ml    Verbal order obtained from   Medication check witness by Fely malhotra,LPN,  2 identifiers checked  by both nurses   Visit Vitals  /69   Pulse 72   Ht 5' (1.524 m)   Wt 147 lb (66.7 kg)   BMI 28.71 kg/m²     Urine pregnancy test negative Depo  Depo Provera  150 mg /1 mL   Was place in the rIGHT GLUTEUS    LOT #[id number] expires 04/0122  Patient observed for 15 minutes and discharge home in stable condition  Discharge instructions given to follow up in office in 90 days for her next injection     Results for orders placed or performed in visit on 05/19/20   AMB POC URINE PREGNANCY TEST, VISUAL COLOR COMPARISON   Result Value Ref Range    VALID INTERNAL CONTROL POC Yes     HCG urine, Ql. (POC) Negative Negative

## 2020-11-03 ENCOUNTER — HOSPITAL ENCOUNTER (EMERGENCY)
Age: 35
Discharge: HOME | End: 2020-11-03
Attending: EMERGENCY MEDICINE
Payer: MEDICAID

## 2020-11-03 VITALS
OXYGEN SATURATION: 98 % | BODY MASS INDEX: 32.61 KG/M2 | HEART RATE: 88 BPM | SYSTOLIC BLOOD PRESSURE: 136 MMHG | WEIGHT: 167 LBS | RESPIRATION RATE: 18 BRPM | TEMPERATURE: 98.1 F | DIASTOLIC BLOOD PRESSURE: 94 MMHG

## 2020-11-03 DIAGNOSIS — N61.1 ABSCESS OF RIGHT NIPPLE: Primary | ICD-10-CM

## 2020-11-03 PROCEDURE — 99282 EMERGENCY DEPT VISIT SF MDM: CPT

## 2020-11-03 RX ORDER — CEPHALEXIN 500 MG/1
500 CAPSULE ORAL 4 TIMES DAILY
Qty: 28 CAP | Refills: 0 | Status: SHIPPED | OUTPATIENT
Start: 2020-11-03 | End: 2020-11-10

## 2020-11-03 NOTE — ED PROVIDER NOTES
EMERGENCY DEPARTMENT HISTORY AND PHYSICAL EXAM    Date: 11/3/2020  Patient Name: Kemar Alarcon    History of Presenting Illness     Chief Complaint   Patient presents with    Skin Problem         History Provided By: Patient     Chief Complaint:right nipple drainage   Duration: 2-3 weeks   Timing:  intermittent  Location: right nipple   Quality: throbbing, pressure   Severity: moderate   Modifying Factors: better \"after the bump popped\"    Associated Symptoms: none       Additional History (Context): Kemar Alarcon is a 30 y.o. female with a history of anemia who presents today for history as listed above. Patient states she has had her nipples pierced for the past 3 years and only recently had an issue on the right side. Reports purulent drainage  that began yesterday. Reports moderate relief with this. Reports she took the piercing out since. Is not breast feeding       PCP: Foreign Frederick MD    Current Outpatient Medications   Medication Sig Dispense Refill    cephALEXin (Keflex) 500 mg capsule Take 1 Cap by mouth four (4) times daily for 7 days. 28 Cap 0    ferrous sulfate ER (IRON) 160 mg (50 mg iron) TbER tablet Take 1 Tab by mouth daily. Past History     Past Medical History:  Past Medical History:   Diagnosis Date    Anemia        Past Surgical History:  Past Surgical History:   Procedure Laterality Date    HX DILATION AND CURETTAGE         Family History:  Family History   Problem Relation Age of Onset    Diabetes Mother     Hypertension Mother        Social History:  Social History     Tobacco Use    Smoking status: Never Smoker    Smokeless tobacco: Never Used   Substance Use Topics    Alcohol use: Yes    Drug use: No       Allergies:  No Known Allergies      Review of Systems   Review of Systems   Constitutional: Negative for chills and fever. HENT: Negative for congestion, rhinorrhea and sore throat. Respiratory: Negative for cough and shortness of breath.     Cardiovascular: Negative for chest pain. Gastrointestinal: Negative for abdominal pain, blood in stool, constipation, diarrhea, nausea and vomiting. Genitourinary: Negative for dysuria, frequency and hematuria. Musculoskeletal: Negative for back pain and myalgias. Skin: Negative for rash and wound. Nipple drainage    Neurological: Negative for dizziness and headaches. All other systems reviewed and are negative. All Other Systems Negative  Physical Exam     Vitals:    11/03/20 1309   BP: (!) 136/94   Pulse: 88   Resp: 18   Temp: 98.1 °F (36.7 °C)   SpO2: 98%   Weight: 75.8 kg (167 lb)     Physical Exam  Vitals signs and nursing note reviewed. Constitutional:       General: She is not in acute distress. Appearance: She is well-developed. She is not diaphoretic. HENT:      Head: Normocephalic and atraumatic. Eyes:      Conjunctiva/sclera: Conjunctivae normal.   Neck:      Musculoskeletal: Normal range of motion and neck supple. Cardiovascular:      Rate and Rhythm: Normal rate and regular rhythm. Heart sounds: Normal heart sounds. Pulmonary:      Effort: Pulmonary effort is normal. No respiratory distress. Breath sounds: Normal breath sounds. Chest:      Chest wall: No tenderness. Breasts:         Right: Nipple discharge (scant) present. No bleeding, inverted nipple, skin change or tenderness. Left: No bleeding, inverted nipple, nipple discharge, skin change or tenderness. Comments: Scant amount of purulent drainage noted at the piercing site. No tenderness to palpation. No induration or fluctuance. No surrounding cellulitis or nipple inversion, piercing has been removed  Abdominal:      General: Bowel sounds are normal. There is no distension. Palpations: Abdomen is soft. Tenderness: There is no abdominal tenderness. There is no guarding or rebound. Musculoskeletal:         General: No deformity. Skin:     General: Skin is warm and dry.    Neurological: Mental Status: She is alert and oriented to person, place, and time. Deep Tendon Reflexes: Reflexes are normal and symmetric. Diagnostic Study Results     Labs -   No results found for this or any previous visit (from the past 12 hour(s)). Radiologic Studies -   No orders to display     CT Results  (Last 48 hours)    None        CXR Results  (Last 48 hours)    None            Medical Decision Making   I am the first provider for this patient. I reviewed the vital signs, available nursing notes, past medical history, past surgical history, family history and social history. Vital Signs-Reviewed the patient's vital signs. Records Reviewed: Nursing Notes and Old Medical Records     Procedures: None   Procedures    Provider Notes (Medical Decision Making):     Differential: Abscess, cellulitis, mastitis, cancer      Plan: No I&D needed at this time. Did express scant amount of purulent drainage. Will discharge home with antibiotics and have stressed the importance of hot compresses. Have given breast specialist to follow-up with. Patient states she is happy and agrees with this plan. MED RECONCILIATION:  No current facility-administered medications for this encounter. Current Outpatient Medications   Medication Sig    cephALEXin (Keflex) 500 mg capsule Take 1 Cap by mouth four (4) times daily for 7 days.  ferrous sulfate ER (IRON) 160 mg (50 mg iron) TbER tablet Take 1 Tab by mouth daily. Disposition:  Home     DISCHARGE NOTE:   Pt has been reexamined. Patient has no new complaints, changes, or physical findings. Care plan outlined and precautions discussed. Results of workup were reviewed with the patient. All medications were reviewed with the patient. All of pt's questions and concerns were addressed. Patient was instructed and agrees to follow up with PCP as well as to return to the ED upon further deterioration. Patient is ready to go home.     Follow-up Information     Follow up With Specialties Details Why [facility] EMERGENCY DEPT Emergency Medicine  As needed [address]  [phone]    [facility]  Schedule an appointment as soon as possible for a visit  [address]          Current Discharge Medication List      START taking these medications    Details   cephALEXin (Keflex) 500 mg capsule Take 1 Cap by mouth four (4) times daily for 7 days. Qty: 28 Cap, Refills: 0                 Diagnosis     Clinical Impression:   1. Abscess of right nipple          \"Please note that this dictation was completed with [employer], the computer voice recognition software. Quite often unanticipated grammatical, syntax, homophones, and other interpretive errors are inadvertently transcribed by the computer software. Please disregard these errors. Please excuse any errors that have escaped final proofreading. \"

## 2020-11-03 NOTE — DISCHARGE INSTRUCTIONS

## 2020-11-03 NOTE — ED TRIAGE NOTES
Alert female arrives to ED c/o drainage, redness to rt nipple. Pt reports hx of nipple piercing, took piercing out 3 months ago has had this in past. Pain currently 1/10 throbbing.

## 2020-11-03 NOTE — ED NOTES
I have reviewed discharge instructions with the patient. The patient verbalized understanding. Pt agreeable to dc plan, pt in no distress ambulatory to ed lobby.